# Patient Record
Sex: FEMALE | Race: WHITE
[De-identification: names, ages, dates, MRNs, and addresses within clinical notes are randomized per-mention and may not be internally consistent; named-entity substitution may affect disease eponyms.]

---

## 2023-04-05 ENCOUNTER — HOSPITAL ENCOUNTER (OUTPATIENT)
Dept: HOSPITAL 75 - RAD | Age: 23
End: 2023-04-05
Payer: COMMERCIAL

## 2023-04-05 DIAGNOSIS — N83.202: ICD-10-CM

## 2023-04-05 DIAGNOSIS — Z30.431: Primary | ICD-10-CM

## 2023-04-05 PROCEDURE — 76830 TRANSVAGINAL US NON-OB: CPT

## 2023-04-05 PROCEDURE — 76856 US EXAM PELVIC COMPLETE: CPT

## 2023-04-05 NOTE — DIAGNOSTIC IMAGING REPORT
PROCEDURE: Pelvic comp/transvaginal sonogram.



TECHNIQUE: Complete transabdominal and transvaginal pelvic

ultrasound was performed. In addition, limited pelvic Doppler was

performed.



INDICATION:  Pelvic pain, evaluate IUD.



Uterus is anteverted measuring 6.1 x 2.4 x 4.57 cm. Endometrium

is 4 mm in thickness. The IUD appears to be appropriately

centered in the endometrial canal. No myometrial mass is

detected. Right ovary measures 3.4 x 2.1 x 1.7 cm and the left

ovary measures 3.2 x 2.1 x 2.9 cm. Left ovary contains a probable

hemorrhagic cyst measuring approximately 2 cm in size. There is

blood flow to the ovaries. No free fluid is detected.



IMPRESSION: 

1.  The IUD is appropriately centered within the endometrial

canal. 

2.  2 cm hemorrhagic left ovarian cyst.







Dictated by: 



  Dictated on workstation # [id number]